# Patient Record
Sex: FEMALE | Race: BLACK OR AFRICAN AMERICAN | NOT HISPANIC OR LATINO | Employment: FULL TIME | URBAN - METROPOLITAN AREA
[De-identification: names, ages, dates, MRNs, and addresses within clinical notes are randomized per-mention and may not be internally consistent; named-entity substitution may affect disease eponyms.]

---

## 2021-05-28 ENCOUNTER — APPOINTMENT (OUTPATIENT)
Dept: GENERAL RADIOLOGY | Facility: HOSPITAL | Age: 31
End: 2021-05-28

## 2021-05-28 ENCOUNTER — HOSPITAL ENCOUNTER (EMERGENCY)
Facility: HOSPITAL | Age: 31
Discharge: HOME | End: 2021-05-28
Attending: EMERGENCY MEDICINE | Admitting: EMERGENCY MEDICINE

## 2021-05-28 VITALS
WEIGHT: 197 LBS | SYSTOLIC BLOOD PRESSURE: 146 MMHG | HEIGHT: 69 IN | OXYGEN SATURATION: 99 % | TEMPERATURE: 97.9 F | RESPIRATION RATE: 24 BRPM | DIASTOLIC BLOOD PRESSURE: 95 MMHG | BODY MASS INDEX: 29.18 KG/M2 | HEART RATE: 106 BPM

## 2021-05-28 DIAGNOSIS — S82.831A CLOSED FRACTURE OF DISTAL END OF RIGHT FIBULA, UNSPECIFIED FRACTURE MORPHOLOGY, INITIAL ENCOUNTER: Primary | ICD-10-CM

## 2021-05-28 PROCEDURE — 99283 EMERGENCY DEPT VISIT LOW MDM: CPT

## 2021-05-28 PROCEDURE — 73630 X-RAY EXAM OF FOOT: CPT

## 2021-05-28 PROCEDURE — 73610 X-RAY EXAM OF ANKLE: CPT

## 2021-05-28 RX ORDER — HYDROCODONE BITARTRATE AND ACETAMINOPHEN 5; 325 MG/1; MG/1
1 TABLET ORAL EVERY 6 HOURS PRN
Qty: 12 TABLET | Refills: 0 | Status: SHIPPED | OUTPATIENT
Start: 2021-05-28 | End: 2021-06-03 | Stop reason: HOSPADM

## 2021-05-28 RX ORDER — HYDROCODONE BITARTRATE AND ACETAMINOPHEN 5; 325 MG/1; MG/1
1 TABLET ORAL ONCE
Status: COMPLETED | OUTPATIENT
Start: 2021-05-28 | End: 2021-05-28

## 2021-05-28 RX ADMIN — HYDROCODONE BITARTRATE AND ACETAMINOPHEN 1 TABLET: 5; 325 TABLET ORAL at 21:18

## 2021-06-02 ENCOUNTER — PRE-ADMISSION TESTING (OUTPATIENT)
Dept: PREADMISSION TESTING | Facility: HOSPITAL | Age: 31
End: 2021-06-02

## 2021-06-02 LAB
FLUAV SUBTYP SPEC NAA+PROBE: NOT DETECTED
FLUBV RNA ISLT QL NAA+PROBE: NOT DETECTED
SARS-COV-2 RNA PNL SPEC NAA+PROBE: NOT DETECTED

## 2021-06-02 PROCEDURE — 87636 SARSCOV2 & INF A&B AMP PRB: CPT

## 2021-06-02 PROCEDURE — C9803 HOPD COVID-19 SPEC COLLECT: HCPCS

## 2021-06-03 ENCOUNTER — ANESTHESIA (OUTPATIENT)
Dept: PERIOP | Facility: HOSPITAL | Age: 31
End: 2021-06-03

## 2021-06-03 ENCOUNTER — ANESTHESIA EVENT CONVERTED (OUTPATIENT)
Dept: ANESTHESIOLOGY | Facility: HOSPITAL | Age: 31
End: 2021-06-03

## 2021-06-03 ENCOUNTER — ANESTHESIA EVENT (OUTPATIENT)
Dept: PERIOP | Facility: HOSPITAL | Age: 31
End: 2021-06-03

## 2021-06-03 ENCOUNTER — APPOINTMENT (OUTPATIENT)
Dept: GENERAL RADIOLOGY | Facility: HOSPITAL | Age: 31
End: 2021-06-03

## 2021-06-03 ENCOUNTER — HOSPITAL ENCOUNTER (OUTPATIENT)
Facility: HOSPITAL | Age: 31
Setting detail: HOSPITAL OUTPATIENT SURGERY
Discharge: HOME | End: 2021-06-03
Attending: ORTHOPAEDIC SURGERY | Admitting: ORTHOPAEDIC SURGERY

## 2021-06-03 VITALS
OXYGEN SATURATION: 97 % | WEIGHT: 197 LBS | TEMPERATURE: 97.1 F | SYSTOLIC BLOOD PRESSURE: 134 MMHG | RESPIRATION RATE: 16 BRPM | HEIGHT: 69 IN | BODY MASS INDEX: 29.18 KG/M2 | HEART RATE: 72 BPM | DIASTOLIC BLOOD PRESSURE: 88 MMHG

## 2021-06-03 DIAGNOSIS — S82.831D CLOSED FRACTURE OF DISTAL END OF RIGHT FIBULA WITH ROUTINE HEALING, UNSPECIFIED FRACTURE MORPHOLOGY, SUBSEQUENT ENCOUNTER: Primary | ICD-10-CM

## 2021-06-03 LAB
B-HCG UR QL: NEGATIVE
INTERNAL NEGATIVE CONTROL: NEGATIVE
INTERNAL POSITIVE CONTROL: POSITIVE
Lab: NORMAL

## 2021-06-03 PROCEDURE — 25010000002 DEXAMETHASONE PER 1 MG: Performed by: NURSE ANESTHETIST, CERTIFIED REGISTERED

## 2021-06-03 PROCEDURE — C1713 ANCHOR/SCREW BN/BN,TIS/BN: HCPCS | Performed by: ORTHOPAEDIC SURGERY

## 2021-06-03 PROCEDURE — 25010000002 MIDAZOLAM PER 1 MG: Performed by: NURSE ANESTHETIST, CERTIFIED REGISTERED

## 2021-06-03 PROCEDURE — 25010000002 PROPOFOL 10 MG/ML EMULSION: Performed by: NURSE ANESTHETIST, CERTIFIED REGISTERED

## 2021-06-03 PROCEDURE — 76000 FLUOROSCOPY <1 HR PHYS/QHP: CPT

## 2021-06-03 PROCEDURE — 25010000002 ROPIVACAINE PER 1 MG: Performed by: NURSE ANESTHETIST, CERTIFIED REGISTERED

## 2021-06-03 PROCEDURE — 25010000002 ONDANSETRON PER 1 MG: Performed by: NURSE ANESTHETIST, CERTIFIED REGISTERED

## 2021-06-03 PROCEDURE — 25010000002 FENTANYL CITRATE (PF) 50 MCG/ML SOLUTION: Performed by: NURSE ANESTHETIST, CERTIFIED REGISTERED

## 2021-06-03 PROCEDURE — 25010000003 CEFAZOLIN IN DEXTROSE 2-4 GM/100ML-% SOLUTION: Performed by: ORTHOPAEDIC SURGERY

## 2021-06-03 PROCEDURE — 81025 URINE PREGNANCY TEST: CPT | Performed by: ANESTHESIOLOGY

## 2021-06-03 DEVICE — ISOLA SPINE SYSTEM ROD BENDERS (TUBULAR) SET
Type: IMPLANTABLE DEVICE | Site: ANKLE | Status: FUNCTIONAL
Brand: ISOLA

## 2021-06-03 DEVICE — SCRW CANC FUL/THRD 4.0X18MM: Type: IMPLANTABLE DEVICE | Site: ANKLE | Status: FUNCTIONAL

## 2021-06-03 DEVICE — SCRW CORT S/TAP 2.7X24MM: Type: IMPLANTABLE DEVICE | Site: ANKLE | Status: FUNCTIONAL

## 2021-06-03 DEVICE — SCRW CORT S/TAP 3.5X16MM: Type: IMPLANTABLE DEVICE | Site: ANKLE | Status: FUNCTIONAL

## 2021-06-03 DEVICE — SCRW CORT S/TAP 3.5X18MM: Type: IMPLANTABLE DEVICE | Site: ANKLE | Status: FUNCTIONAL

## 2021-06-03 DEVICE — PLT TBG 1/3 LCP W COL 8HL 93MM: Type: IMPLANTABLE DEVICE | Site: ANKLE | Status: FUNCTIONAL

## 2021-06-03 RX ORDER — LIDOCAINE HYDROCHLORIDE 10 MG/ML
0.5 INJECTION, SOLUTION EPIDURAL; INFILTRATION; INTRACAUDAL; PERINEURAL ONCE AS NEEDED
Status: COMPLETED | OUTPATIENT
Start: 2021-06-03 | End: 2021-06-03

## 2021-06-03 RX ORDER — MIDAZOLAM HYDROCHLORIDE 1 MG/ML
1 INJECTION INTRAMUSCULAR; INTRAVENOUS
Status: DISCONTINUED | OUTPATIENT
Start: 2021-06-03 | End: 2021-06-03 | Stop reason: HOSPADM

## 2021-06-03 RX ORDER — LIDOCAINE HYDROCHLORIDE 10 MG/ML
INJECTION, SOLUTION EPIDURAL; INFILTRATION; INTRACAUDAL; PERINEURAL AS NEEDED
Status: DISCONTINUED | OUTPATIENT
Start: 2021-06-03 | End: 2021-06-03 | Stop reason: SURG

## 2021-06-03 RX ORDER — OXYCODONE HYDROCHLORIDE 5 MG/1
5-10 TABLET ORAL EVERY 4 HOURS PRN
Qty: 24 TABLET | Refills: 0 | Status: SHIPPED | OUTPATIENT
Start: 2021-06-03

## 2021-06-03 RX ORDER — MIDAZOLAM HYDROCHLORIDE 1 MG/ML
INJECTION INTRAMUSCULAR; INTRAVENOUS
Status: COMPLETED | OUTPATIENT
Start: 2021-06-03 | End: 2021-06-03

## 2021-06-03 RX ORDER — FAMOTIDINE 20 MG/1
20 TABLET, FILM COATED ORAL ONCE
Status: COMPLETED | OUTPATIENT
Start: 2021-06-03 | End: 2021-06-03

## 2021-06-03 RX ORDER — PROPOFOL 10 MG/ML
VIAL (ML) INTRAVENOUS AS NEEDED
Status: DISCONTINUED | OUTPATIENT
Start: 2021-06-03 | End: 2021-06-03 | Stop reason: SURG

## 2021-06-03 RX ORDER — SODIUM CHLORIDE 0.9 % (FLUSH) 0.9 %
10 SYRINGE (ML) INJECTION EVERY 12 HOURS SCHEDULED
Status: DISCONTINUED | OUTPATIENT
Start: 2021-06-03 | End: 2021-06-03 | Stop reason: HOSPADM

## 2021-06-03 RX ORDER — ONDANSETRON 2 MG/ML
4 INJECTION INTRAMUSCULAR; INTRAVENOUS ONCE AS NEEDED
Status: DISCONTINUED | OUTPATIENT
Start: 2021-06-03 | End: 2021-06-03 | Stop reason: HOSPADM

## 2021-06-03 RX ORDER — DEXAMETHASONE SODIUM PHOSPHATE 4 MG/ML
INJECTION, SOLUTION INTRA-ARTICULAR; INTRALESIONAL; INTRAMUSCULAR; INTRAVENOUS; SOFT TISSUE AS NEEDED
Status: DISCONTINUED | OUTPATIENT
Start: 2021-06-03 | End: 2021-06-03 | Stop reason: SURG

## 2021-06-03 RX ORDER — ASPIRIN 325 MG
325 TABLET, DELAYED RELEASE (ENTERIC COATED) ORAL DAILY
Qty: 14 TABLET | Refills: 0 | Status: SHIPPED | OUTPATIENT
Start: 2021-06-04 | End: 2021-06-18

## 2021-06-03 RX ORDER — ONDANSETRON 2 MG/ML
INJECTION INTRAMUSCULAR; INTRAVENOUS AS NEEDED
Status: DISCONTINUED | OUTPATIENT
Start: 2021-06-03 | End: 2021-06-03 | Stop reason: SURG

## 2021-06-03 RX ORDER — BUPIVACAINE HYDROCHLORIDE 2.5 MG/ML
INJECTION, SOLUTION EPIDURAL; INFILTRATION; INTRACAUDAL
Status: COMPLETED | OUTPATIENT
Start: 2021-06-03 | End: 2021-06-03

## 2021-06-03 RX ORDER — SODIUM CHLORIDE 0.9 % (FLUSH) 0.9 %
10 SYRINGE (ML) INJECTION AS NEEDED
Status: DISCONTINUED | OUTPATIENT
Start: 2021-06-03 | End: 2021-06-03 | Stop reason: HOSPADM

## 2021-06-03 RX ORDER — FAMOTIDINE 10 MG/ML
20 INJECTION, SOLUTION INTRAVENOUS ONCE
Status: CANCELLED | OUTPATIENT
Start: 2021-06-03 | End: 2021-06-03

## 2021-06-03 RX ORDER — SODIUM CHLORIDE, SODIUM LACTATE, POTASSIUM CHLORIDE, CALCIUM CHLORIDE 600; 310; 30; 20 MG/100ML; MG/100ML; MG/100ML; MG/100ML
9 INJECTION, SOLUTION INTRAVENOUS CONTINUOUS
Status: DISCONTINUED | OUTPATIENT
Start: 2021-06-03 | End: 2021-06-03 | Stop reason: HOSPADM

## 2021-06-03 RX ORDER — CEFAZOLIN SODIUM 2 G/100ML
2 INJECTION, SOLUTION INTRAVENOUS ONCE
Status: COMPLETED | OUTPATIENT
Start: 2021-06-03 | End: 2021-06-03

## 2021-06-03 RX ORDER — FENTANYL CITRATE 50 UG/ML
50 INJECTION, SOLUTION INTRAMUSCULAR; INTRAVENOUS
Status: DISCONTINUED | OUTPATIENT
Start: 2021-06-03 | End: 2021-06-03 | Stop reason: HOSPADM

## 2021-06-03 RX ORDER — FENTANYL CITRATE 50 UG/ML
INJECTION, SOLUTION INTRAMUSCULAR; INTRAVENOUS
Status: COMPLETED | OUTPATIENT
Start: 2021-06-03 | End: 2021-06-03

## 2021-06-03 RX ORDER — MAGNESIUM HYDROXIDE 1200 MG/15ML
LIQUID ORAL AS NEEDED
Status: DISCONTINUED | OUTPATIENT
Start: 2021-06-03 | End: 2021-06-03 | Stop reason: HOSPADM

## 2021-06-03 RX ADMIN — CEFAZOLIN SODIUM 2 G: 10 INJECTION, POWDER, FOR SOLUTION INTRAVENOUS at 12:20

## 2021-06-03 RX ADMIN — LIDOCAINE HYDROCHLORIDE 50 MG: 10 INJECTION, SOLUTION EPIDURAL; INFILTRATION; INTRACAUDAL; PERINEURAL at 12:23

## 2021-06-03 RX ADMIN — DEXAMETHASONE SODIUM PHOSPHATE 4 MG: 4 INJECTION, SOLUTION INTRA-ARTICULAR; INTRALESIONAL; INTRAMUSCULAR; INTRAVENOUS; SOFT TISSUE at 12:28

## 2021-06-03 RX ADMIN — LIDOCAINE HYDROCHLORIDE 0.4 ML: 10 INJECTION, SOLUTION EPIDURAL; INFILTRATION; INTRACAUDAL; PERINEURAL at 11:47

## 2021-06-03 RX ADMIN — ROPIVACAINE HYDROCHLORIDE 6 ML/HR: 5 INJECTION, SOLUTION EPIDURAL; INFILTRATION; PERINEURAL at 13:21

## 2021-06-03 RX ADMIN — ONDANSETRON 4 MG: 2 INJECTION INTRAMUSCULAR; INTRAVENOUS at 12:28

## 2021-06-03 RX ADMIN — FAMOTIDINE 20 MG: 20 TABLET, FILM COATED ORAL at 11:47

## 2021-06-03 RX ADMIN — BUPIVACAINE HYDROCHLORIDE 30 ML: 2.5 INJECTION, SOLUTION EPIDURAL; INFILTRATION; INTRACAUDAL; PERINEURAL at 12:05

## 2021-06-03 RX ADMIN — FENTANYL CITRATE 100 MCG: 50 INJECTION, SOLUTION INTRAMUSCULAR; INTRAVENOUS at 12:05

## 2021-06-03 RX ADMIN — PROPOFOL 250 MG: 10 INJECTION, EMULSION INTRAVENOUS at 12:23

## 2021-06-03 RX ADMIN — PROPOFOL 25 MCG/KG/MIN: 10 INJECTION, EMULSION INTRAVENOUS at 12:28

## 2021-06-03 RX ADMIN — SODIUM CHLORIDE, POTASSIUM CHLORIDE, SODIUM LACTATE AND CALCIUM CHLORIDE 9 ML/HR: 600; 310; 30; 20 INJECTION, SOLUTION INTRAVENOUS at 11:47

## 2021-06-03 RX ADMIN — MIDAZOLAM HYDROCHLORIDE 2 MG: 1 INJECTION, SOLUTION INTRAMUSCULAR; INTRAVENOUS at 12:05

## 2021-06-03 NOTE — ANESTHESIA PROCEDURE NOTES
Popliteal catheter      Patient reassessed immediately prior to procedure    Patient location during procedure: pre-op  Reason for block: at surgeon's request and post-op pain management  Performed by  CRNA: Hector Gomez CRNA  Assisted by: Mahi Seals RN  Preanesthetic Checklist  Completed: patient identified, IV checked, site marked, risks and benefits discussed, surgical consent, monitors and equipment checked, pre-op evaluation and timeout performed  Prep:  Pt Position: left lateral decubitus  Sterile barriers:cap, gloves, mask and sterile barriers  Prep: ChloraPrep  Patient monitoring: blood pressure monitoring, continuous pulse oximetry and EKG  Procedure  Sedation:yes  Performed under: local infiltration  Guidance:ultrasound guided  Images:still images obtained, printed/placed on chart    Laterality:right  Block Type:popliteal  Injection Technique:catheter  Needle Type:echogenic  Needle Gauge:18 G  Resistance on Injection: none  Catheter Size:20 G  Cath Depth at skin: 8 cm    Medications Used: fentaNYL citrate (PF) (SUBLIMAZE) injection, 100 mcg  midazolam (VERSED) injection, 2 mg  bupivacaine PF (MARCAINE) 0.25 % injection, 30 mL  Med admintered at 6/3/2021 12:05 PM      Post Assessment  Injection Assessment: negative aspiration for heme, no paresthesia on injection and incremental injection  Patient Tolerance:comfortable throughout block  Complications:no  Additional Notes  Procedure:                                                         The pt was placed in  lateral position.  The Insertion site was  prepped and Draped in sterile fashion.  The pt was anesthetized with  IV Sedation( see meds).  Skin and cutaneous tissue was infiltrated and anesthetized with 1% Lidocaine 3 mls via a 25g needle.  A BBraun 4 inch 18g echogenic needle was then  inserted approximately 3 cm proximal to the popliteal fossa at the lateral mid biceps femoris and advanced In-plane with Ultrasound guidance.  Normal Saline  PSF was utilized for hydrodissection of tissue.  The popliteal artery was visualized and the common peroneal and tibial bifurcation was located.  LA injection spread was visualized, injection was incremental 1-5ml, injection pressure was normal or little, no intraneural injection, no vascular injection.  .  A BBraun 20g wire stylet catheter was placed via the needle with ultrasound visualization and confirmation with NS fluid bolus. The labeled catheter was then secured at insertion site with exofin tissue adhesive, benzoin, steristrips  and a CHG transparent dressing was placed over. Thank you

## 2021-06-03 NOTE — BRIEF OP NOTE
ANKLE OPEN REDUCTION INTERNAL FIXATION  Progress Note    Maria M Leung  6/3/2021    Pre-op Diagnosis:   right distal fibula fracture       Post-Op Diagnosis Codes:     * Closed fracture of right distal fibula [S82.831A]    Procedure/CPT® Codes:  VT OPEN TX DISTAL FIBULAR FRACTURE LAT MALLEOLUS [03057]      Procedure(s):  OPEN REDUCTION INTERNAL FIXATION RIGHT DISTAL FIBULA FRACTURE    Surgeon(s):  Marcos Wharton MD     Assistant: MARYLOU Talley    Anesthesia: General with Block    Staff:   Circulator: Rita Steve RN; Roland Bennett RN  Scrub Person: Marcy De La Garza Paula M  Vendor Representative: Dany Luz  Nursing Assistant: Giovanna Okeefe  Assistant: Hector Bowman RNFA  Assistant: Hector Bowman RNFA      Estimated Blood Loss: minimal    Urine Voided: * No values recorded between 6/3/2021 12:17 PM and 6/3/2021  1:18 PM *    Specimens:                None          Drains: * No LDAs found *    Findings: Right ankle displaced distal fibula fracture    Complications: None    Implants: Synthes one third tubular plate, 8 hole and 2.7 mm lag screw    Tourniquet time: 39 minutes at 300 mmHg    Assistant: Hector Bowman RNFA  was responsible for performing the following activities: Retraction, fixation of the fracture, closure and splint application and their skilled assistance was necessary for the success of this case.    Marcos Wharton MD     Date: 6/3/2021  Time: 13:30 EDT

## 2021-06-03 NOTE — ANESTHESIA PROCEDURE NOTES
Airway  Urgency: elective    Date/Time: 6/3/2021 12:23 PM  Airway not difficult    General Information and Staff    Patient location during procedure: OR  CRNA: Nicky Wing CRNA    Indications and Patient Condition  Indications for airway management: airway protection    Preoxygenated: yes  Mask difficulty assessment: 1 - vent by mask    Final Airway Details  Final airway type: supraglottic airway      Successful airway: I-gel  Size 4    Number of attempts at approach: 1  Assessment: lips, teeth, and gum same as pre-op    Additional Comments  LMA placed without difficulty, ventilation with assist, equal breath sounds and symmetric chest rise and fall

## 2021-06-03 NOTE — OP NOTE
PREOPERATIVE DIAGNOSIS:  Right ankle displaced distal fibular fracture.    POSTOPERATIVE DIAGNOSIS:  Right ankle displaced distal fibular fracture.    OPERATIVE PROCEDURE:  Open reduction and internal fixation of right distal fibular fracture.    PRINCIPAL SURGEON:  Marcos Wharton MD    ASSISTANT:  MARYLOU Barakat (whose assistance was necessary on the case for positioning the patient, retraction,fixation of the fracture and closure).    ANESTHESIA:  General with popliteal catheter.    ESTIMATED BLOOD LOSS:  Minimal.    COMPLICATIONS:  None.    SPECIMENS:  None.    IMPLANTS USED:  Synthes 1/3 tubular plate, 8-hole, and a 2.7 mm lag screw.    TOURNIQUET TIME:  39 minutes at 300 mmHg.    INDICATIONS FOR PROCEDURE:  This patient is a very pleasant 30-year-old female who injured her right ankle when she twisted it walking her dog last Friday.  She went to the [facility] Emergency Room.  X-rays revealed the displaced distal fibular fracture.  I saw her in the office yesterday.  Skin was intact.  Neurovascular intact distally.  She had 5 mm of medial clear space widening and a spiral fracture of the distal fibula indicative of an unstable distal fibular fracture with the amount of medial clear space widening.  We discussed all the risks, benefits and alteratives of open reduction and internal fixation of the right distal fibular fracture.  She agreed to proceed and surgical consent form was signed.    PROCEDURE IN DETAIL:  She was seen in the preoperative holding area.  The right ankle was marked to confirm the correct operative site.  She was seen by anesthesia.  She received Ancef 2 grams prophylactic antibiotic within 1 hour of incision time.  She was brought back to the operating room.  Anesthesia had also performed a popliteal catheter for perioperative pain control.  Once back in the OR, general anesthesia was induced without difficulty.  Non sterile tourniquet was applied to the right thigh.  The right lower  extremity was prepped and draped in the usual fashion.  Timeout was performed to confirm open reduction and internal fixation of the right ankle on the above named patient.  The right lower extremity was exsanguinated with an Esmarch and tourniquet was deflated at 300 mmHg.  I did make a longitudinal incision along the distal fibula on the lateral aspect of the ankle.  This was carried down to the subcutaneous tissue.  Adequate hemostasis was maintained with Bovie electrocautery.  The spiral fracture of the distal fibula of the joint was identified.  Soft tissue was cleared out of the fracture site.  Holmium retractor was placed anterior to the fracture.  Reduction was performed using a combination of ankle dorsiflexion and a lobster claw clamp to correct rotation and length.  We then placed one 2.7 mm lag screw going anterior to posterior going perpendicular to the fracture site.  This had excellent bicortical purchase and maintained reduction when the clamp was removed.  We selected an 8-hole 1/3 tubular plate under direct C-arm visualization.  We then placed one proximal bicortical screw in the fibular shaft.  We then placed two distal cancellous screws; one distal bicortical screw just distal to the fracture site and two more proximal bicortical screws, all of these had excellent purchase.  Final C-arm images were taken and saved in AP, mortise and lateral views as well as cotton test which all confirmed adequate reduction of the fracture with restoration of the mortise with no widening of the medial clear space or syndesmosis.  At this time, C-arm was backed out.  The wound was copiously irrigated with betadine soaked saline.  We then proceeded with closure.  The fascia was closed with 0 Vicryl, the dermis with 2-0 Vicryl, and the skin was closed with running 3-0 nylon horizontal mattress stitch.  We then applied a sterile dressing with adaptic 4 x 4s, ABD, soft roll.  We then applied a posterior roll splint.   The posterior split was applied with ankle in neutral.  This was overlapped with more soft roll and ACE bandage.  Tourniquet was deflated at 39 minutes.  She was awoken from anesthesia without difficulty, transferred to the recovery room in stable condition.  All sponge and needle counts were correct x3.    POSTOPERATIVE PLAN:  She may be discharged home from recovery.  She will continue to do strict nonweightbearing of the right lower extremity.  She will do ice and elevation.  Oxycodone as needed for pain and aspirin for DVT prophylaxis.  We will see her back in the office in 2 weeks for her first postoperative check.  In the meantime, she will call [phone] with any questions or concerns.

## 2021-06-03 NOTE — ANESTHESIA PREPROCEDURE EVALUATION
Anesthesia Evaluation     Patient summary reviewed and Nursing notes reviewed   no history of anesthetic complications:  NPO Solid Status: > 8 hours  NPO Liquid Status: > 2 hours           Airway   Mallampati: I  TM distance: >3 FB  Neck ROM: full  No difficulty expected  Dental - normal exam     Pulmonary     breath sounds clear to auscultation  Cardiovascular   Exercise tolerance: good (4-7 METS)    Rhythm: regular  Rate: normal        Neuro/Psych  GI/Hepatic/Renal/Endo      Musculoskeletal     Abdominal   (-) obese    Abdomen: soft.   Substance History      OB/GYN          Other   arthritis,                      Anesthesia Plan    ASA 1     general with block     intravenous induction     Anesthetic plan, all risks, benefits, and alternatives have been provided, discussed and informed consent has been obtained with: patient.    Plan discussed with CRNA.

## 2021-06-04 NOTE — PROGRESS NOTES
FRANK Theodore    Nerve Cath Post Op Call    Patient Name: Maria M Leung  :  1990  MRN:  [id number]  Date of Discharge: 6/3/2021    Nerve Cath Post Op Call:    Analgesia:Good  Pain Score:7/10  Side Effects:None  Catheter Site:clean  Patient Controlled ON Q pump infusion rate: 8ml/hr  Catheter Plan:Will continue with plan at home without changes and The patient was instructed to call ON CALL Anesthesia provider for any questions or problems  Patient/Family instructed to call ON CALL anesthesia provider for any questions or problems.  Patient Follow Up:  Patient provided the educational video about the nerve catheter and pain pump.  Video adequately prepared patient to manage pain pump at home.  Tylenol use: no acetaminophen use  NSAID use: no NSAID use

## 2021-06-06 NOTE — PROGRESS NOTES
FRANK Theodore    Nerve Cath Post Op Call    Patient Name: Maria M Leung  :  1990  MRN:  [id number]  Date of Discharge: 6/3/2021    Nerve Cath Post Op Call:    Analgesia:Excellent  Pain Score:0/10  Side Effects:None  Catheter Site:clean  Patient Controlled ON Q pump infusion rate: 6ml/hr  Catheter Plan:Patient/Family member instructed to remove the catheter during telephone contact  Patient/Family instructed to call ON CALL anesthesia provider for any questions or problems.  Patient Follow Up:

## 2021-06-09 NOTE — H&P
Patient: Maria M Leung    Date of Admission: 6/3/2021 10:09 AM    YOB: 1990    Medical Record Number: [id number]    Attending Physician: Marcos Wharton MD    Consulting Physician: Marcos Wharton MD      Chief Complaints: R ankle fracture with pain    History of Present Illness: Maria M is a pleasant 30-year-old female who fell walking her dog on 5-28 and injured her right ankle.  She went back to [facility] ER where x-rays revealed a right ankle fracture and she was placed into a splint.  She was seen in the office on 6-2. Evaluation in the office revealed an unstable spiral fracture of the distal fibula.  I recommended proceeding with open reduction internal fixation of the right ankle for which she presents today.      Allergies   Allergen Reactions   • Acetaminophen GI Intolerance        Home Medications:  No medications prior to admission.       Current Medications:  Scheduled Meds:  Continuous Infusions:No current facility-administered medications for this encounter.    PRN Meds:.    History reviewed. No pertinent past medical history.     Past Surgical History:   Procedure Laterality Date   • ANKLE OPEN REDUCTION INTERNAL FIXATION Right 6/3/2021    Procedure: OPEN REDUCTION INTERNAL FIXATION RIGHT DISTAL FIBULA FRACTURE;  Surgeon: Marcos Wharton MD;  Location: [facility];  Service: Orthopedics;  Laterality: Right;   • BRONCHOSCOPY     • WISDOM TOOTH EXTRACTION          Social History     Occupational History   • Not on file   Tobacco Use   • Smoking status: Never Smoker   • Smokeless tobacco: Never Used   Substance and Sexual Activity   • Alcohol use: Yes     Comment: OCC   • Drug use: Never   • Sexual activity: Yes     Partners: Male      Social History     Social History Narrative   • Not on file      History reviewed. No pertinent family history.      Review of Systems:     Musculoskeletal:R ankle pain and unable to ambulate, otherwise negative or  noncontributory to the orthopedic exam.     Physical Exam: 30 y.o. female  General Appearance:    Alert, cooperative, in no acute distress                   Vitals:    06/03/21 1329 06/03/21 1330 06/03/21 1345 06/03/21 1400   BP: 127/75 127/75 124/99 134/88   BP Location: Right arm  Right arm Right arm   Patient Position: Lying  Lying Lying   Pulse: 82 81 74 72   Resp: 16 16 16 16   Temp:       TempSrc:       SpO2: 93% 100% 97% 97%   Weight:       Height:            Extremities:  Right ankle skin is intact with swelling and ecchymosis on the lateral aspect of the ankle  She is tender over the lateral malleolus and over the deltoid ligament  Neurologically intact distally      Diagnostic Tests:    Admission on 06/03/2021, Discharged on 06/03/2021   Component Date Value Ref Range Status   • HCG, Urine, QL 06/03/2021 Negative  Negative Final   • Lot Number 06/03/2021 [id number]   7-22   Final   • Internal Positive Control 06/03/2021 Positive   Final   • Internal Negative Control 06/03/2021 Negative   Final   Pre-Admission Testing on 06/02/2021   Component Date Value Ref Range Status   • COVID19 06/02/2021 Not Detected  Not Detected - Ref. Range Final   • Influenza A PCR 06/02/2021 Not Detected  Not Detected Final   • Influenza B PCR 06/02/2021 Not Detected  Not Detected Final       Right ankle x-rays 5-28-21 revealed a spiral fracture of the distal fibula with medial clear space widening indicative of an unstable distal fibular fracture.    Assessment:A 30-year-old female with right ankle unstable distal fibular fracture presents for open reduction internal fixation.    Patient Active Problem List   Diagnosis   • Closed fracture of distal end of right fibula with routine healing           Plan:    We'll proceed with right ankle open reduction internal fixation in the  operating room today.  Risks, benefits and alternatives of surgery were discussed and she agrees to proceed.  Short-leg splint will be placed after surgery  she will be strict nonweightbearing.  We will see her back in the office in 2 weeks for her first postoperative check.      Discharge Plan:She will be discharged home after surgery.      Date: 6/9/2021    Marcos Wharton MD

## (undated) DEVICE — GLV SURG PREMIERPRO MIC LTX PF SZ8 BRN

## (undated) DEVICE — ANTIBACTERIAL UNDYED BRAIDED (POLYGLACTIN 910), SYNTHETIC ABSORBABLE SURGICAL SUTURE: Brand: COATED VICRYL

## (undated) DEVICE — APPL CHLORAPREP TINTED 26ML TEAL

## (undated) DEVICE — SUT ETHLN 3/0 PC5 18IN 1893G

## (undated) DEVICE — PENCL ROCKRSWCH MEGADYNE W/HOLSTR 10FT SS

## (undated) DEVICE — STERILE PVP: Brand: MEDLINE INDUSTRIES, INC.

## (undated) DEVICE — PATIENT RETURN ELECTRODE, SINGLE-USE, CONTACT QUALITY MONITORING, ADULT, WITH 9FT CORD, FOR PATIENTS WEIGING OVER 33LBS. (15KG): Brand: MEGADYNE

## (undated) DEVICE — SPNG GZ WOVN 4X4IN 12PLY 10/BX STRL

## (undated) DEVICE — BIT DRL QC DIA 2.5X110MM

## (undated) DEVICE — Device

## (undated) DEVICE — UNDERCAST PADDING: Brand: DEROYAL

## (undated) DEVICE — ANTIBACTERIAL UNDYED BRAIDED (POLYGLACTIN 910), SYNTHETIC ABSORBABLE SUTURE: Brand: COATED VICRYL

## (undated) DEVICE — PK EXTREM LOWR 10

## (undated) DEVICE — DRSNG GZ CURAD XEROFORM NONADHR OVERWRAP 5X9IN

## (undated) DEVICE — PUMP PAIN AUTOFUSER AUTO SELCT NOBOLUS 1TO14ML/HR 550ML DISP

## (undated) DEVICE — DRSNG PAD ABD 8X10IN STRL

## (undated) DEVICE — SNAP KOVER: Brand: UNBRANDED

## (undated) DEVICE — GOWN,REINFORCE,POLY,SIRUS,BREATH SLV,XLG: Brand: MEDLINE